# Patient Record
Sex: MALE | Race: WHITE | URBAN - METROPOLITAN AREA
[De-identification: names, ages, dates, MRNs, and addresses within clinical notes are randomized per-mention and may not be internally consistent; named-entity substitution may affect disease eponyms.]

---

## 2017-04-28 ENCOUNTER — TELEPHONE (OUTPATIENT)
Dept: OTHER | Facility: CLINIC | Age: 27
End: 2017-04-28

## 2017-04-28 NOTE — TELEPHONE ENCOUNTER
4/28/2017    Call Regarding Onboarding [employer] [facility]     Attempt 1    Message on voicemail     Comments:         Outreach   SHAYNA

## 2017-05-04 NOTE — TELEPHONE ENCOUNTER
5/4/2017    Call Regarding Onboarding [employer] [facility]    Attempt 2    Message on voicemail     Comments:       Outreach   yang

## 2017-05-10 NOTE — TELEPHONE ENCOUNTER
Call Regarding Onboarding [employer]    Attempt 3    Message on voicemail     Comments:       Outreach   Zoraida Fox

## 2017-12-12 ENCOUNTER — OFFICE VISIT (OUTPATIENT)
Dept: OPHTHALMOLOGY | Facility: CLINIC | Age: 27
End: 2017-12-12
Attending: OPHTHALMOLOGY
Payer: COMMERCIAL

## 2017-12-12 DIAGNOSIS — H52.13 MYOPIA OF BOTH EYES: ICD-10-CM

## 2017-12-12 DIAGNOSIS — H00.14 CHALAZION OF LEFT UPPER EYELID: Primary | ICD-10-CM

## 2017-12-12 PROCEDURE — 99213 OFFICE O/P EST LOW 20 MIN: CPT | Mod: ZF

## 2017-12-12 PROCEDURE — 92015 DETERMINE REFRACTIVE STATE: CPT | Mod: ZF

## 2017-12-12 RX ORDER — DOXYCYCLINE 100 MG/1
100 CAPSULE ORAL 2 TIMES DAILY
Qty: 20 CAPSULE | Refills: 0 | Status: SHIPPED | OUTPATIENT
Start: 2017-12-12

## 2017-12-12 ASSESSMENT — TONOMETRY
OS_IOP_MMHG: 26
IOP_METHOD: TONOPEN
OD_IOP_MMHG: 25

## 2017-12-12 ASSESSMENT — VISUAL ACUITY
OS_PH_SC: 20/20
OD_SC: 20/150
OD_PH_SC: 20/20
METHOD: SNELLEN - LINEAR
OS_SC: 20/100

## 2017-12-12 ASSESSMENT — CUP TO DISC RATIO
OS_RATIO: 0.2
OD_RATIO: 0.2

## 2017-12-12 ASSESSMENT — REFRACTION_MANIFEST
OS_CYLINDER: SPHERE
OD_CYLINDER: SPHERE
OD_SPHERE: -2.00
OS_SPHERE: -1.75

## 2017-12-12 ASSESSMENT — SLIT LAMP EXAM - LIDS: COMMENTS: 1+ BLEPHARITIS

## 2017-12-12 ASSESSMENT — EXTERNAL EXAM - LEFT EYE: OS_EXAM: NORMAL

## 2017-12-12 ASSESSMENT — EXTERNAL EXAM - RIGHT EYE: OD_EXAM: NORMAL

## 2017-12-12 ASSESSMENT — CONF VISUAL FIELD
METHOD: COUNTING FINGERS
OS_NORMAL: 1
OD_NORMAL: 1

## 2017-12-12 NOTE — PROGRESS NOTES
HPI:  Coy Gray is a 27 year old male history of myopia presents with 1 week left upper eyelid swelling. Notes tenderness. Also noted recent URI sxs. Denies redness, pain, flashes or floaters      Patient notes recent exposure to Coxsackie virus    POHx: myopia  PMHx: denies  Current Medications:   No current outpatient prescriptions on file prior to visit.  No current facility-administered medications on file prior to visit.   FHx: denies glaucoma, macular deg  PSHx: [place] teeth removal      Current Eye Medications:  None    Assessment & Plan:  (H00.14) Chalazion of left upper eyelid  (primary encounter diagnosis)  Plan: doxycycline (VIBRAMYCIN) 100 MG capsule  Start artificial tears four times daily (best used before reading, using a computer or watching TV)  Start warm compresses for five minute twice daily (warm wash cloth or uncooked bag of rice in a clean sock)   Start lid hygiene (gently scrubbing the upper and lower lids baby shampoo or while in the shower)        (H52.13) Myopia of both eyes  Comment: stable  Plan: new MRx dispensed  Patient interested in laser in situ keratomileusis (LASIK), will refer to       Return if symptoms worsen or fail to improve.    Patient seen and discussed with Dr. Tati Whitfield MD  PGY3, Dept of Ophthalmology  Pager [phone]      Attending Physician Attestation:  Complete documentation of historical and exam elements from today's encounter can be found in the full encounter summary report (not reduplicated in this progress note).  I personally obtained the chief complaint(s) and history of present illness.  I confirmed and edited as necessary the review of systems, past medical/surgical history, family history, social history, and examination findings as documented by others; and I examined the patient myself.  I personally reviewed the relevant tests, images, and reports as documented above.  I formulated and edited as necessary the assessment  and plan and discussed the findings and management plan with the patient and family. . - Forrest Duffy MD

## 2017-12-12 NOTE — NURSING NOTE
Chief Complaints and History of Present Illnesses   Patient presents with     Consult For     swollen LE     HPI    Affected eye(s):  Left   Symptoms:        Frequency:  Constant       Do you have eye pain now?:  No      Comments:  swollen SPIKE and irritation that started about 2 weeks ago.  Sunday into Monday the irritation and swelling increased  +pressure  Pain with blinking  States no trama  States va is the same   Massiel Berrios COT 8:39 AM December 12, 2017

## 2017-12-12 NOTE — MR AVS SNAPSHOT
After Visit Summary   12/12/2017    Coy Gray    MRN: [id number]           Patient Information     Date Of Birth          1990        Visit Information        Provider Department      12/12/2017 8:30 AM Sh[facility]field [facility]        Today's Diagnoses     Chalazion of left upper eyelid    -  1    Myopia of both eyes          Care Instructions    Start artificial tears four times daily (best used before reading, using a computer or watching TV)  Start warm compresses for five minute twice daily (warm wash cloth or uncooked bag of rice in a clean sock)   Start lid hygiene (gently scrubbing the upper and lower lids baby shampoo or while in the shower)              Follow-ups after your visit        Follow-up notes from your care team     Return if symptoms worsen or fail to improve.      Who to contact     Please call your clinic at [phone] to:    Ask questions about your health    Make or cancel appointments    Discuss your medicines    Learn about your test results    Speak to your doctor   If you have compliments or concerns about an experience at your clinic, or if you wish to file a complaint, please contact [facility] Patient Relations at [phone] or email us at [email or web address]         Additional Information About Your Visit        MyChart Information     [facility] gives you secure access to your electronic health record. If you see a primary care provider, you can also send messages to your care team and make appointments. If you have questions, please call your primary care clinic.  If you do not have a primary care provider, please call [phone] and they will assist you.      [facility] [employer] [facility] is an electronic gateway that provides easy, online access to your medical records. With [facility], you can request a clinic appointment, read your test results, renew a prescription or communicate with your care team.     To access your  existing account, please contact your [facility] or call [phone] for assistance.        Care EveryWhere ID     This is your Care EveryWhere ID. This could be used by other organizations to access your [facility] medical records  [id number]         Blood Pressure from Last 3 Encounters:   No data found for BP    Weight from Last 3 Encounters:   No data found for Wt              Today, you had the following     No orders found for display         Today's Medication Changes          These changes are accurate as of: 12/12/17 11:59 PM.  If you have any questions, ask your nurse or doctor.               Start taking these medicines.        Dose/Directions    doxycycline 100 MG capsule   Commonly known as:  VIBRAMYCIN   Used for:  Chalazion of left upper eyelid   Started by:  Shun Whitfield MD        Dose:  100 mg   Take 1 capsule (100 mg) by mouth 2 times daily   Quantity:  20 capsule   Refills:  0            Where to get your medicines      These medications were sent to [facility] [address] [place] [address] [address] [place] [id number]    Hours:  TRANSPLANT PHONE NUMBER [phone] Phone:  [phone]     doxycycline 100 MG capsule                Primary Care Provider Office Phone # Fax #    Purnima Dannie, RAMONA [phone]       [place] [address] [place] [address]        Equal Access to Services     YADY CARTER AH: Hadii melony puga hadasho Soomaali, waaxda luqadaha, qaybta kaalmada adeegyada, quoc hudson hayxander mccloud. So [facility] [phone].    ATENCIÓN: Si habla español, tiene a mensah disposición servicios gratuitos de asistencia lingüística. Llame al [phone].    We comply with applicable federal civil rights laws and [place] laws. We do not discriminate on the basis of race, color, national origin, age, disability, sex, sexual orientation, or gender  identity.            Thank you!     Thank you for choosing [facility]  for your care. Our goal is always to provide you with excellent care. Hearing back from our patients is one way we can continue to improve our services. Please take a few minutes to complete the written survey that you may receive in the mail after your visit with us. Thank you!             Your Updated Medication List - Protect others around you: Learn how to safely use, store and throw away your medicines at www.disposemymeds.org.          This list is accurate as of: 12/12/17 11:59 PM.  Always use your most recent med list.                   Brand Name Dispense Instructions for use Diagnosis    doxycycline 100 MG capsule    VIBRAMYCIN    20 capsule    Take 1 capsule (100 mg) by mouth 2 times daily    Chalazion of left upper eyelid

## 2017-12-12 NOTE — PATIENT INSTRUCTIONS
Start artificial tears four times daily (best used before reading, using a computer or watching TV)  Start warm compresses for five minute twice daily (warm wash cloth or uncooked bag of rice in a clean sock)   Start lid hygiene (gently scrubbing the upper and lower lids baby shampoo or while in the shower)

## 2018-11-10 ENCOUNTER — TELEPHONE (OUTPATIENT)
Dept: OPHTHALMOLOGY | Facility: CLINIC | Age: 28
End: 2018-11-10

## 2018-11-10 NOTE — TELEPHONE ENCOUNTER
[facility] Call Center    Phone Message    May a detailed message be left on voicemail: yes    Reason for Call: Other: Requests copy of his Glasses Rx mailed out to him - call Pt with any questions - otherwise just mail him out his Glasses Rx so he can get new ones made - Thanks     Action Taken: Message routed to:  [facility]

## 2020-03-10 ENCOUNTER — HEALTH MAINTENANCE LETTER (OUTPATIENT)
Age: 30
End: 2020-03-10

## 2020-12-27 ENCOUNTER — HEALTH MAINTENANCE LETTER (OUTPATIENT)
Age: 30
End: 2020-12-27

## 2021-04-24 ENCOUNTER — HEALTH MAINTENANCE LETTER (OUTPATIENT)
Age: 31
End: 2021-04-24

## 2021-10-09 ENCOUNTER — HEALTH MAINTENANCE LETTER (OUTPATIENT)
Age: 31
End: 2021-10-09

## 2022-05-21 ENCOUNTER — HEALTH MAINTENANCE LETTER (OUTPATIENT)
Age: 32
End: 2022-05-21

## 2022-09-11 ENCOUNTER — HEALTH MAINTENANCE LETTER (OUTPATIENT)
Age: 32
End: 2022-09-11

## 2023-06-03 ENCOUNTER — HEALTH MAINTENANCE LETTER (OUTPATIENT)
Age: 33
End: 2023-06-03